# Patient Record
Sex: FEMALE | Race: WHITE
[De-identification: names, ages, dates, MRNs, and addresses within clinical notes are randomized per-mention and may not be internally consistent; named-entity substitution may affect disease eponyms.]

---

## 2019-03-18 ENCOUNTER — HOSPITAL ENCOUNTER (OUTPATIENT)
Dept: HOSPITAL 75 - PREOP | Age: 64
Discharge: HOME | End: 2019-03-18
Attending: SURGERY
Payer: COMMERCIAL

## 2019-03-18 VITALS — BODY MASS INDEX: 25.71 KG/M2 | HEIGHT: 66 IN | WEIGHT: 160 LBS

## 2019-03-18 DIAGNOSIS — Z01.818: Primary | ICD-10-CM

## 2019-03-25 ENCOUNTER — HOSPITAL ENCOUNTER (OUTPATIENT)
Dept: HOSPITAL 75 - ENDO | Age: 64
Discharge: HOME | End: 2019-03-25
Attending: SURGERY
Payer: COMMERCIAL

## 2019-03-25 VITALS — SYSTOLIC BLOOD PRESSURE: 86 MMHG | DIASTOLIC BLOOD PRESSURE: 49 MMHG

## 2019-03-25 VITALS — DIASTOLIC BLOOD PRESSURE: 66 MMHG | SYSTOLIC BLOOD PRESSURE: 106 MMHG

## 2019-03-25 VITALS — BODY MASS INDEX: 25.71 KG/M2 | WEIGHT: 160 LBS | HEIGHT: 66 IN

## 2019-03-25 VITALS — DIASTOLIC BLOOD PRESSURE: 94 MMHG | SYSTOLIC BLOOD PRESSURE: 137 MMHG

## 2019-03-25 DIAGNOSIS — Z92.21: ICD-10-CM

## 2019-03-25 DIAGNOSIS — Z85.42: ICD-10-CM

## 2019-03-25 DIAGNOSIS — Z12.11: Primary | ICD-10-CM

## 2019-03-25 DIAGNOSIS — K57.30: ICD-10-CM

## 2019-03-25 DIAGNOSIS — Z80.0: ICD-10-CM

## 2019-03-25 NOTE — CONSCIOUS SEDATION/ASA
Conscious Sedation Pre-Proced


Time


09:47





ASA Score


2


For ASA 3 and 4: Consider anesthesia and medical clearance. Also, for 

patients with a history of failed moderate sedation consider anesthesia.

















Airway 


 


Lungs 


 


Heart 


 


 ASA score


 


 ASA 1: a normal healthy patient


 


 ASA 2:  a patient with a mild systemic disease (mid diabetes, controlled 

hypertension, obesity 


 


 ASA 3:  a patient with a severe systemic disease that limits activity  (angina

, COPD, prior Myocardial infarction)


 


 ASA 4:  a patient with an incapacitating disease that is a constant threat to 

life (CHF, renal failure)


 


 ASA 5:  a moribund patient not expected to survive 24 hrs.  (ruptured aneurysm)


 


 ASA 6:  a declared brain-dead patient whose organs are being harvested.


 


 For emergent operations, add the letter E after the classification











Mallampati Classification


Grade 1





Sedation Plan


Discussed options with patient/fam


The patient is an appropriate candidate to undergo the planned procedure, 

sedation, and anesthesia.





The patient immediately re-assessed prior to indication.











JOHN MONTEIRO MD Mar 25, 2019 09:47

## 2019-03-25 NOTE — ENDO PROCEDURE RECORD
Endo Procedure Report


Date of Procedure


Last Colonoscopy:  Yes (november 2012)


Mar 25, 2019


Surgeon (s)


JOHN MONTEIRO MD





Post Procedure/Op Diagnosis





sigmoid diverticulosis





Procedure Performed





colonoscopy to cecum





Description of Procedure


Anesthesia Type:  Conscious Sedation


Specimen(s) collected/removed


none


Description of the Procedure


Indication for the procedure: This lady came in for screening colonoscopy.  She 

reported positive family history of colon cancer.





Informed consent was obtained after reviewing the procedure in detail.





Description of the procedure: She was placed in left lateral decubitus position 

and her vital signs were monitored.  Conscious sedation was achieved using 

Versed and fentanyl.  Digital rectal examination was unremarkable.  The 

colonoscope was then introduced into the rectum and advanced all the way up to 

the cecum.





The scope was then withdrawn slowly and the mucosa examined in a systematic 

fashion.





Finding: Sigmoid diverticulosis.  No polyps were found.





She tolerated the procedure well and was taken back to the nursing area in a 

stable condition.





Impression: Screening colonoscopy.  Positive family history.  No polyps.  

Recommend repeating in 5 years.





Copy


Copies To 1:   YONY HELLER XAVIER M MD Mar 25, 2019 10:42

## 2019-03-25 NOTE — DISCHARGE INST-SIMPLE/STANDARD
Discharge Inst-Standard


Discharge Medications


New, Converted or Re-Newed RX:  Other





Patient Instructions/Follow Up


Plan of Care/Instructions/FU:  


repeat colonoscopy in 5 years


Activity as Tolerated:  Yes


Discharge Diet:  No Restrictions











JOHN MONTEIRO MD Mar 25, 2019 10:43

## 2019-03-25 NOTE — HISTORY & PHYSICIAL
History of Present Illness


History of Present Illness


Reason for visit/HPI


to undergo screening colonoscopy.  Reports a family history of colon cancer


Date of Admission


3/25/19


Date Seen by a Provider:  Mar 25, 2019


Time Seen by a Provider:  09:44


I consulted on this patient on


3/25/19


 09:44


Attending Physician


John Dye MD


Admitting Physician


Melanie Kim DO


Consult








Allergies and Home Medications


Allergies


Coded Allergies:  


     No Known Drug Allergies (Unverified , 3/18/19)





Home Medications


No Active Prescriptions or Reported Meds





Patient Home Medication List


Home Medication List Reviewed:  Yes





Past Medical-Social-Family Hx


Patient Social History


Marrital Status:  


Employed/Student:  employed


2nd Hand Smoke Exposure:  No


Recent Foreign Travel:  No


Contact w/other who traveled:  No


Recent Hopitalizations:  No





Immunizations Up To Date


Date of Influenza Vaccine:  Oct 1, 2018





Seasonal Allergies


Seasonal Allergies:  No





Surgeries


Yes (breast implants, )


Hysterectomy, Oophorectomy





Respiratory


No





Cardiovascular


No





Neurological


No





Genitourinary


No





Gastrointestinal


No





Musculoskeletal


No





Endocrine


History of Endocrine Disorders:  No





HEENT


History of HEENT Disorders:  No





Cancer


Yes


Uterine


Did You Recieve Any Treatments:  Yes


Type of Treatment:  Chemotherapy, Surgical Intervention





Psychosocial


History of Psychiatric Problem:  No





Integumentary


History of Skin or Integumenta:  No





Blood Transfusions


History of Blood Disorders:  No





Family Medical History


Family Hx:  


Colon cancer





Review of Systems


Constitutional:  no symptoms reported


EENTM:  no symptoms reported


Respiratory:  no symptoms reported


Cardiovascular:  no symptoms reported


Gastrointestinal:  no symptoms reported


Genitourinary:  no symptoms reported


Musculoskeletal:  no symptoms reported


Skin:  no symptoms reported


Psychiatric/Neurological:  No Symptoms Reported





Physical Exam


Vital Signs


Capillary Refill :


Height, Weight, BMI


Height: 5'6.00"


Weight: 160lbs. 0.0oz. [id number]; 25.8 BMI


Method:


General Appearance:  No Apparent Distress


Neck:  Normal Inspection


Respiratory:  Lungs Clear


Gastrointestinal:  Non Tender, Soft


Rectal:  Deferred


Back:  Normal Inspection


Neurologic/Psychiatric:  Alert, Oriented x3


Skin:  Warm/Dry





Assessment/Plan


Assessment and Plan


lady here to undergo screening colonoscopy.  Discussed in detail.





Admission Diagnosis


Admission Status:  Other (Outpt Proc)











JOHN DYE MD Mar 25, 2019 09:46